# Patient Record
Sex: FEMALE | HISPANIC OR LATINO | Employment: OTHER | URBAN - METROPOLITAN AREA
[De-identification: names, ages, dates, MRNs, and addresses within clinical notes are randomized per-mention and may not be internally consistent; named-entity substitution may affect disease eponyms.]

---

## 2024-03-15 ENCOUNTER — OFFICE VISIT (OUTPATIENT)
Dept: PODIATRY | Facility: CLINIC | Age: 69
End: 2024-03-15
Payer: COMMERCIAL

## 2024-03-15 VITALS — SYSTOLIC BLOOD PRESSURE: 106 MMHG | DIASTOLIC BLOOD PRESSURE: 64 MMHG

## 2024-03-15 DIAGNOSIS — M79.672 ARCH PAIN OF LEFT FOOT: Primary | ICD-10-CM

## 2024-03-15 DIAGNOSIS — M79.671 ARCH PAIN, RIGHT: ICD-10-CM

## 2024-03-15 PROCEDURE — 99203 OFFICE O/P NEW LOW 30 MIN: CPT | Performed by: PODIATRIST

## 2024-03-15 NOTE — LETTER
3/15/2024         RE: Jamia Alford  [address]   [place] [address]        Dear Colleague,    Thank you for referring your patient, Jamia Alford, to the [facility] [place]. Please see a copy of my visit note below.    Foot & Ankle Surgery  March 15, 2024    CC: Bilateral arch    I was asked to see Jamia Alford regarding the chief complaint by: Self    HPI:  Pt is a 69 year old female who presents with above complaint.  Worsening bilateral arch pain.  She has three-quarter length custom made orthotics but states that the pain can be worse with this.  No other treatment noted.  She does mention low back and right sided sciatic nerve issues.    ROS:   Pos for CC.  The patient denies current nausea, vomiting, chills, fevers, belly pain, calf pain, chest pain or SOB.  Complete remainder of ROS is otherwise neg.    VITALS:  There were no vitals filed for this visit.    PMH:  No past medical history on file.    SXHX:  No past surgical history on file.     MEDS:    No current outpatient medications on file.     No current facility-administered medications for this visit.       ALL:   No Known Allergies    FMH:  No family history on file.    SocHx:    Social History     Socioeconomic History     Marital status:      Spouse name: Not on file     Number of children: Not on file     Years of education: Not on file     Highest education level: Not on file   Occupational History     Not on file   Tobacco Use     Smoking status: Unknown     Smokeless tobacco: Not on file   Substance and Sexual Activity     Alcohol use: Not on file     Drug use: Not on file     Sexual activity: Not on file   Other Topics Concern     Not on file   Social History Narrative     Not on file     Social Determinants of Health     Financial Resource Strain: Not on file   Food Insecurity: Not on file   Transportation Needs: Not on file   Physical Activity: Not on file   Stress: Not on file   Social Connections: Not  on file   Interpersonal Safety: Not on file   Housing Stability: Not on file           EXAMINATION:  Gen:   No apparent distress  Neuro:   A&Ox3, no deficits  Psych:    Answering questions appropriately for age and situation with normal affect  Head:    NCAT  Eye:    Visual scanning without deficit  Ear:    Response to auditory stimuli wnl  Lung:    Non-labored breathing on RA noted  Abd:    NTND per patient report  Lymph:    Neg for pitting/non-pitting edema BLE  Vasc:    Pulses palpable, CFT minimally delayed  Neuro:    Light touch sensation intact to all sensory nerve distributions without paresthesias  Derm:    Neg for nodules, lesions or ulcerations  MSK:    Bilateral lower extremity -I am unable to elicit any specific pain today.  Specifically, there is no pain along the plantar fascia, Baxters nerve, tibial nerve/branches, calcaneus or abductor hallucis muscle belly.  Mild pes planus  Calf:    Neg for redness, swelling or tenderness    Assessment:  69 year old female with bilateral arch pain in setting of low back and right-sided sciatic nerve issues      Medical Decision Making/Plan:  Discussed etiologies, anatomy and options  1.  Bilateral arch pain in setting of low back and right-sided sciatic nerve issues  -I personally reviewed and interpreted the patient's lower extremity history pertinent to today's visit, including imaging/labs, in preparation for initiating a treatment program.  -I am unable to find any specific pain today.  She states that symptoms are generally more severe throughout the day.  Encouraged her to follow-up with in 2 weeks for an afternoon appointment for reassessment if the below plan fails to provide adequate relief.  We also discussed the role that low back issues can cause and lower extremity symptoms  Regarding the heel pain, the Plantar Fasciitis handout was dispensed and discussed.  We talked about stretching, resting/activity modification, icing, NSAID/tylenol use as  tolerated, inserts, supportive/comfortable shoes and minimizing shoeless walking.    -discussed Achilles, plantar fascial and hamstring stretches  -OTC insert information dispensed and discussed        Follow up: 2 weeks or sooner with acute issues      Patient's medical history was reviewed today      Teddy Sellers DPM FACFAS FACFAOM  Podiatric Foot & Ankle Surgeon  [facility]  [phone]    Disclaimer: This note consists of symbols derived from keyboarding, dictation and/or voice recognition software. As a result, there may be errors in the script that have gone undetected. Please consider this when interpreting information found in this chart.          Again, thank you for allowing me to participate in the care of your patient.        Sincerely,        Teddy Sellers DPM, TURNER

## 2024-03-15 NOTE — PATIENT INSTRUCTIONS
Thank you for choosing [facility] / Foot & Ankle Surgery!    DR. BURNHAM'S CLINIC LOCATIONS:     [facility] (Friday) TRIAGE LINE: [phone] [address] [facility]  APPOINTMENTS: [phone]   [address] [address] RADIOLOGY: [phone]    PHYSICAL THERAPY: [phone]    SET UP SURGERY: [phone]   [place] (Mon-Tues AM-Thurs) BILLING QUESTIONS: [phone]   [address] FAX: [phone]   [address] [address] [facility]: [phone]        PLANTAR FASCIITIS  Plantar fasciitis is often referred to as heel spurs or heel pain. Plantar fasciitis is a very common problem that affects people of all foot shapes, age, weight and activity level. Pain may be in the arch or on the weight-bearing surface of the heel. The pain may come on without injury or identifiable cause. Pain is generally present when first getting out of bed in the morning or up from a seated break.     CAUSES  The plantar fascia is a dense fibrous band of tissue that stretches across the bottom surface of the foot. The fascia helps support the foot muscles and arch. Plantar fasciitis is thought to be caused by mechanical strain or overload. Frequent walking without shoes or wearing unsupportive shoes is thought to cause structural overload and ultimately inflammation of the plantar fascia. Some people have heel spurs that can be seen on x-ray. The heel spur is actually a minor component of plantar fascitis and is largely ignored.       SELF TREATMENT   The easiest solution is to stop walking around your home without shoes. Plantar fasciitis is largely a shoe problem. Shoes are either not being worn often enough or your current shoes are inadequate for your weight, foot structure or activity level. The majority of shoes on the market today are not sufficient to resist development of plantar fasciitis or to promote healing. Assume that your current shoes are inadequate and will need to be replaced.  Even high quality shoes wear out with 6 months to one year of frequent use. Weight loss is another option. Losing ten pounds in the next two months may be enough to resolve the problem. Ice applied to the area of pain two to three times per day for ten minutes each session can be very helpful. Warm foot soaks in epsom salts can also relieve pain. This should continue until the problem resolves. Achilles tendon stretching is essential. Stretch multiple times daily to promote healing and to prevent recurrence in the future. Over all stretching of the body is helpful as well such as the calves, thighs and lower back. Normally when one area of the body is tight, other areas are too. Gentle Yoga can be good for this.     Over the counter topical anti inflammatories can be helpful such as biofreeze, bengay, salon pas, ect...  Oral ibuprofen or aleve is recommended as well to try to calm down inflammation.     Night splints can be helpful to gradually stretch the foot at night as a lot of pain is when you get up in the morning. Taking a towel or thera band and stretching the foot back multiple times before you get ou of bed can be beneficial as well.     MEDICAL TREATMENT  Medical treatments often include custom arch supports, cortisone injections, physical therapy, splints to be worn in bed, prescription medications and surgery. The home treatments listed above will be necessary regardless of these advanced medical treatments. Surgery is rarely needed but is very helpful in selected cases.     PROGNOSIS  Plantar fasciitis can last from one day to a lifetime. Some people get intermittent fascitis that is very short-lived. Others suffer daily for years. Excessive body weight, frequent bare foot walking, long hours on the feet, inadequate shoes, predisposing foot structures and excessive activity such as running are all potential issues that lead to chronic and/or recurring plantar fascitis. Having plantar fasciitis means  that you are forever prone to this problem and will require modification of some of the above factors. Most people seek treatment within one to four months. Healing usually requires a similar one to four month time frame. Healing time is relative to the amount of effort spent treating the problem.   Plantar fasciitis is highly recurrent. Risk factors often continue, including return to bare foot walking, inadequate shoes, excessive body weight, excessive activities, etc. Your life style and foot structure may predispose you to recurrent plantar fasciitis. A daily prevention regimen can be very helpful. Ongoing use of shoe inserts, careful attention to appropriate shoes, daily Achilles stretching, etc. may prevent recurrence. Prompt attention at the earliest warning signs of heel pain can resolve the problem in as short as a few days.     EXERCISES  Stair Exercise: Step on the stairs with the ball of your foot and hold your position for at least 15 seconds, then slowly step down with the heels of your foot. You can do this daily and as often as you want.   Picking the Towel: Sit comfortably and then pick the towel up with your toes. You can use any object other than a towel as long as the material can be soft and you can pick it up with your toes.  Rolling the Bottle: Use a small ball or frozen water bottle and then roll it around with your foot.   Flex the Toes: Sit comfortably and then flex your toes by pointing it towards the floor or towards your body. This will relax and flex your foot and exercise your plantar fascia, the calf, and the Achilles tendon. The inability of the foot to stretch often causes the bunching up of the plantar fascia area leading to the pain.  Calf/Achilles Stretching: Lay on you back and raise one foot, then point your toes towards the floor. See photo below:               Hold each stretch for 10 seconds. Stretch 10 times per set, three sets per day. Morning, afternoon and evening. If  your heel pain is very severe in the morning, consider doing the first set of stretches before you get out of bed.      OVER THE COUNTER INSERT RECOMMENDATIONS  SuperFeet   Sofsole Fit Spenco   Power Step   Walk-Fit Arch Cradles     Most of these can be found at your local [employer], [employer], or online.  **A good high quality over the counter insert should cost around $40-$50      [facility]  [address] [place] [address]  [phone]   [address]  [phone] [place]  [address] [facility]  [phone]   [place]  [address]  [phone]   [place]  [address] [facility]  [phone] [place] [address]  [phone]   [place]  [address]  [phone]   [place]  [address] [place] [address]  [phone] [place]  [address]  [phone]        OVER THE COUNTER INSERTS    Most of these can be found at your local [employer], [employer], [employer], or online:    SuperFeet   Sofsole Fit Spenco   Power Step   Walk-Fit ([employer])  *For heel pain* Arch Cradles  *For heel pain*       ** A good high quality over the counter insert should cost around $40-$50    ** Capsulitis/Metarasalgia - try adding a metatarsal pad on your inserts or find an insert with one built in

## 2024-03-15 NOTE — PROGRESS NOTES
Foot & Ankle Surgery  March 15, 2024    CC: Bilateral arch    I was asked to see Jamia Alford regarding the chief complaint by: Self    HPI:  Pt is a 69 year old female who presents with above complaint.  Worsening bilateral arch pain.  She has three-quarter length custom made orthotics but states that the pain can be worse with this.  No other treatment noted.  She does mention low back and right sided sciatic nerve issues.    ROS:   Pos for CC.  The patient denies current nausea, vomiting, chills, fevers, belly pain, calf pain, chest pain or SOB.  Complete remainder of ROS is otherwise neg.    VITALS:  There were no vitals filed for this visit.    PMH:  No past medical history on file.    SXHX:  No past surgical history on file.     MEDS:    No current outpatient medications on file.     No current facility-administered medications for this visit.       ALL:   No Known Allergies    FMH:  No family history on file.    SocHx:    Social History     Socioeconomic History    Marital status:      Spouse name: Not on file    Number of children: Not on file    Years of education: Not on file    Highest education level: Not on file   Occupational History    Not on file   Tobacco Use    Smoking status: Unknown    Smokeless tobacco: Not on file   Substance and Sexual Activity    Alcohol use: Not on file    Drug use: Not on file    Sexual activity: Not on file   Other Topics Concern    Not on file   Social History Narrative    Not on file     Social Determinants of Health     Financial Resource Strain: Not on file   Food Insecurity: Not on file   Transportation Needs: Not on file   Physical Activity: Not on file   Stress: Not on file   Social Connections: Not on file   Interpersonal Safety: Not on file   Housing Stability: Not on file           EXAMINATION:  Gen:   No apparent distress  Neuro:   A&Ox3, no deficits  Psych:    Answering questions appropriately for age and situation with normal affect  Head:     NCAT  Eye:    Visual scanning without deficit  Ear:    Response to auditory stimuli wnl  Lung:    Non-labored breathing on RA noted  Abd:    NTND per patient report  Lymph:    Neg for pitting/non-pitting edema BLE  Vasc:    Pulses palpable, CFT minimally delayed  Neuro:    Light touch sensation intact to all sensory nerve distributions without paresthesias  Derm:    Neg for nodules, lesions or ulcerations  MSK:    Bilateral lower extremity -I am unable to elicit any specific pain today.  Specifically, there is no pain along the plantar fascia, Baxters nerve, tibial nerve/branches, calcaneus or abductor hallucis muscle belly.  Mild pes planus  Calf:    Neg for redness, swelling or tenderness    Assessment:  69 year old female with bilateral arch pain in setting of low back and right-sided sciatic nerve issues      Medical Decision Making/Plan:  Discussed etiologies, anatomy and options  1.  Bilateral arch pain in setting of low back and right-sided sciatic nerve issues  -I personally reviewed and interpreted the patient's lower extremity history pertinent to today's visit, including imaging/labs, in preparation for initiating a treatment program.  -I am unable to find any specific pain today.  She states that symptoms are generally more severe throughout the day.  Encouraged her to follow-up with in 2 weeks for an afternoon appointment for reassessment if the below plan fails to provide adequate relief.  We also discussed the role that low back issues can cause and lower extremity symptoms  Regarding the heel pain, the Plantar Fasciitis handout was dispensed and discussed.  We talked about stretching, resting/activity modification, icing, NSAID/tylenol use as tolerated, inserts, supportive/comfortable shoes and minimizing shoeless walking.    -discussed Achilles, plantar fascial and hamstring stretches  -OTC insert information dispensed and discussed        Follow up: 2 weeks or sooner with acute  issues      Patient's medical history was reviewed today      Teddy Sellers DPM [facility]  Podiatric Foot & Ankle Surgeon  [facility]  [phone]    Disclaimer: This note consists of symbols derived from keyboarding, dictation and/or voice recognition software. As a result, there may be errors in the script that have gone undetected. Please consider this when interpreting information found in this chart.